# Patient Record
Sex: MALE | Race: WHITE | URBAN - METROPOLITAN AREA
[De-identification: names, ages, dates, MRNs, and addresses within clinical notes are randomized per-mention and may not be internally consistent; named-entity substitution may affect disease eponyms.]

---

## 2019-08-15 ENCOUNTER — HOSPITAL ENCOUNTER (EMERGENCY)
Facility: CLINIC | Age: 54
Discharge: HOME | End: 2019-08-15
Attending: EMERGENCY MEDICINE | Admitting: EMERGENCY MEDICINE
Payer: COMMERCIAL

## 2019-08-15 VITALS
BODY MASS INDEX: 30.44 KG/M2 | DIASTOLIC BLOOD PRESSURE: 111 MMHG | TEMPERATURE: 97 F | WEIGHT: 250 LBS | HEIGHT: 76 IN | SYSTOLIC BLOOD PRESSURE: 164 MMHG | RESPIRATION RATE: 11 BRPM | HEART RATE: 67 BPM | OXYGEN SATURATION: 94 %

## 2019-08-15 DIAGNOSIS — I49.3 PVC'S (PREMATURE VENTRICULAR CONTRACTIONS): ICD-10-CM

## 2019-08-15 DIAGNOSIS — R03.0 ELEVATED BLOOD PRESSURE READING WITHOUT DIAGNOSIS OF HYPERTENSION: ICD-10-CM

## 2019-08-15 LAB
ANION GAP SERPL CALCULATED.3IONS-SCNC: 1 MMOL/L (ref 3–14)
BASOPHILS # BLD AUTO: 0 10E9/L (ref 0–0.2)
BASOPHILS NFR BLD AUTO: 0.7 %
BUN SERPL-MCNC: 18 MG/DL (ref 7–30)
CALCIUM SERPL-MCNC: 9.7 MG/DL (ref 8.5–10.1)
CHLORIDE SERPL-SCNC: 105 MMOL/L (ref 94–109)
CO2 SERPL-SCNC: 30 MMOL/L (ref 20–32)
CREAT SERPL-MCNC: 1.17 MG/DL (ref 0.66–1.25)
DIFFERENTIAL METHOD BLD: NORMAL
EOSINOPHIL # BLD AUTO: 0.2 10E9/L (ref 0–0.7)
EOSINOPHIL NFR BLD AUTO: 3.8 %
ERYTHROCYTE [DISTWIDTH] IN BLOOD BY AUTOMATED COUNT: 13.2 % (ref 10–15)
GFR SERPL CREATININE-BSD FRML MDRD: 71 ML/MIN/{1.73_M2}
GLUCOSE SERPL-MCNC: 119 MG/DL (ref 70–99)
HCT VFR BLD AUTO: 46.3 % (ref 40–53)
HGB BLD-MCNC: 14.8 G/DL (ref 13.3–17.7)
IMM GRANULOCYTES # BLD: 0 10E9/L (ref 0–0.4)
IMM GRANULOCYTES NFR BLD: 0.2 %
INTERPRETATION ECG - MUSE: NORMAL
LYMPHOCYTES # BLD AUTO: 1 10E9/L (ref 0.8–5.3)
LYMPHOCYTES NFR BLD AUTO: 17.8 %
MCH RBC QN AUTO: 29.1 PG (ref 26.5–33)
MCHC RBC AUTO-ENTMCNC: 32 G/DL (ref 31.5–36.5)
MCV RBC AUTO: 91 FL (ref 78–100)
MONOCYTES # BLD AUTO: 0.3 10E9/L (ref 0–1.3)
MONOCYTES NFR BLD AUTO: 5.6 %
NEUTROPHILS # BLD AUTO: 4 10E9/L (ref 1.6–8.3)
NEUTROPHILS NFR BLD AUTO: 71.9 %
NRBC # BLD AUTO: 0 10*3/UL
NRBC BLD AUTO-RTO: 0 /100
PLATELET # BLD AUTO: 198 10E9/L (ref 150–450)
POTASSIUM SERPL-SCNC: 5 MMOL/L (ref 3.4–5.3)
RBC # BLD AUTO: 5.08 10E12/L (ref 4.4–5.9)
SODIUM SERPL-SCNC: 136 MMOL/L (ref 133–144)
WBC # BLD AUTO: 5.6 10E9/L (ref 4–11)

## 2019-08-15 PROCEDURE — 85025 COMPLETE CBC W/AUTO DIFF WBC: CPT | Performed by: EMERGENCY MEDICINE

## 2019-08-15 PROCEDURE — 93005 ELECTROCARDIOGRAM TRACING: CPT

## 2019-08-15 PROCEDURE — 99284 EMERGENCY DEPT VISIT MOD MDM: CPT

## 2019-08-15 PROCEDURE — 80048 BASIC METABOLIC PNL TOTAL CA: CPT | Performed by: EMERGENCY MEDICINE

## 2019-08-15 ASSESSMENT — MIFFLIN-ST. JEOR: SCORE: 2085.49

## 2019-08-15 NOTE — ED NOTES
Discharge Education provided to [facility] including kjh, and when to return to ED and PCP. Pt verbalized understanding. All questions answered. Pt discharged to [facility] with [facility]    Patient goals before discharge: cinthia  Identified barriers to goal: nhung  Goal met: [facility]

## 2019-08-15 NOTE — ED PROVIDER NOTES
"  History     Chief Complaint:  Irregular heart rate    The history is provided by the patient.      Austin Ruiz is a 54 year old male who presents to the emergency department today for evaluation of an irregular heart rate. The patient had an injection in his back at [facility] around 1030 this morning at L4/L5 for pain. After the injection he reports his heart rate was fluctuating significantly between 38-60 bpm and that his blood pressure was high. He notes he had some lightheadedness and dizziness after the injection but has felt otherwise fine.    Allergies:  No known drug allergies     Medications:    Medications reviewed. No pertinent medications.    Past Medical History:    Lumbosacral stenosis    Past Surgical History:    Elbow surgery, left    Family History:    Family history reviewed. No pertinent family history.     Social History:  The patient was accompanied to the ED by his daughter.  Marital Status:    Alcohol Use: Positive  Tobacco Use: Negative  Smokeless Tobacco Use: Negative     Review of Systems   Cardiovascular:        Irregular heart rate  High blood pressure   All other systems reviewed and are negative.      Physical Exam     Patient Vitals for the past 24 hrs:   BP Temp Temp src Pulse Heart Rate Resp SpO2 Height Weight   08/15/19 1245 (!) 164/111 -- -- 67 59 11 94 % -- --   08/15/19 1240 (!) 164/111 -- -- -- 58 10 94 % -- --   08/15/19 1230 (!) 172/106 -- -- 60 59 15 96 % -- --   08/15/19 1220 (!) 189/128 -- -- -- 69 18 97 % -- --   08/15/19 1215 -- -- -- 62 59 10 97 % -- --   08/15/19 1210 -- -- -- -- 63 12 98 % -- --   08/15/19 1200 (!) 180/121 -- -- 64 62 15 98 % -- --   08/15/19 1152 (!) 198/121 97  F (36.1  C) Oral 65 -- 16 97 % 1.93 m (6' 4\") 113.4 kg (250 lb)        Physical Exam  Constitutional: Alert, attentive  HENT:    Nose: Nose normal.    Mouth/Throat: Oropharynx is clear, mucous membranes are moist   Eyes: EOM are normal.   CV: regular rate and rhythm; no murmurs, rubs " "or gallups  Chest: Effort normal and breath sounds normal.   GI:  There is no tenderness. No distension. Normal bowel sounds  MSK: Normal range of motion.   Neurological: Alert, attentive  Skin: Skin is warm and dry.      Emergency Department Course     ECG:  ECG taken at 1210, ECG read at 1215  Sinus rhythm with occasional premature ventricular complexes  Otherwise normal ECG  Rate 65 bpm. AR interval 182. QRS duration 76. QT/QTc 444/461. P-R-T axes 42 26 45.    Laboratory:  Laboratory findings were communicated with the patient who voiced understanding of the findings.  CBC: AWNL (WBC 5.6, HGB 14.8, )  BMP: Anion gap 1 (L), Glucose 119 (H), o/w WNL (Creatinine 1.17)    Emergency Department Course:  1158 Nursing notes and vitals reviewed.  1200 I performed an exam of the patient as documented above.   1314 Findings and plan explained to the Patient. Patient discharged home with instructions regarding supportive care, medications, and reasons to return. The importance of close follow-up was reviewed.     I personally reviewed the laboratory results with the Patient and answered all related questions prior to discharge.      Impression & Plan      Medical Decision Making:  This is a 54-year-old male who presents for evaluation of possible irregular heartbeat and elevated blood pressure following a lumbar spine injection in clinic prior to arrival.  Cardiopulmonary exam is unremarkable here.  EKG and monitoring showed PVCs are causing no symptoms currently.  However, this may have caused the irregular pulse noted in clinic.  Screening labs showed no concerning findings.  Of note, he is significantly hypertensive, albeit asymptomatic, but this improves during his stay.  I emphasized the importance of following up on this, this may be an isolated event related to his recent injection and PIV placement- he notes he \"hates needles\"- versus undiagnosed hypertension.  He will either follow-up in a few days in clinic " for recheck or purchase a home blood pressure monitor recheck and follow-up thereafter.  He should return immediately for headache, chest pain, or any other concerns.    Diagnosis:    ICD-10-CM    1. Elevated blood pressure reading without diagnosis of hypertension R03.0    2. PVC's (premature ventricular contractions) I49.3        Disposition:  The patient is discharged to home.     Scribe Disclosure:  I, Aracelis Corcoran, am serving as a scribe at 12:01 PM on 8/15/2019 to document services personally performed by Mark Richardson MD based on my observations and the provider's statements to me.    8/15/2019   [facility] EMERGENCY DEPARTMENT       Mark Richardson MD  08/15/19 [id number]

## 2019-08-15 NOTE — ED NOTES
IV removed. Catheter intact.    Discharge Education provided to patient and daughter , and when to return to ED and follow up with PCP. Pt verbalized understanding. All questions answered. Pt discharged to home  with daughter. Vital signs stable.

## 2019-08-15 NOTE — ED AVS SNAPSHOT
[facility] Emergency Department  [address]  [facility] [address]  Phone:  [phone]  Fax:  [phone]                                    Austin Ruiz   MRN: [id number]    Department:  [facility] Emergency Department   Date of Visit:  8/15/2019           After Visit Summary Signature Page    I have received my discharge instructions, and my questions have been answered. I have discussed any challenges I see with this plan with the nurse or doctor.    ..........................................................................................................................................  Patient/Patient Representative Signature      ..........................................................................................................................................  Patient Representative Print Name and Relationship to Patient    ..................................................               ................................................  Date                                   Time    ..........................................................................................................................................  Reviewed by Signature/Title    ...................................................              ..............................................  Date                                               Time          22EPIC Rev 08/18

## 2019-08-15 NOTE — ED TRIAGE NOTES
Had steroid injection of L4/L5 at [facility] at 1030.  When they were discharging him, they said his heart rate was irregular between 40-60 and that his blood pressure was elevated.  Does not feel like his heart rate is irregular but is a little dizzy following the injection.  ABCDs intact.